# Patient Record
Sex: MALE | Race: WHITE | URBAN - METROPOLITAN AREA
[De-identification: names, ages, dates, MRNs, and addresses within clinical notes are randomized per-mention and may not be internally consistent; named-entity substitution may affect disease eponyms.]

---

## 2017-11-21 ENCOUNTER — HOSPITAL ENCOUNTER (OUTPATIENT)
Dept: WOUND CARE | Age: 77
Discharge: HOME | End: 2017-11-21
Attending: INTERNAL MEDICINE | Admitting: INTERNAL MEDICINE

## 2017-11-21 VITALS
RESPIRATION RATE: 18 BRPM | HEIGHT: 69 IN | WEIGHT: 158.29 LBS | SYSTOLIC BLOOD PRESSURE: 128 MMHG | BODY MASS INDEX: 23.44 KG/M2 | TEMPERATURE: 97.8 F | HEART RATE: 56 BPM | DIASTOLIC BLOOD PRESSURE: 72 MMHG

## 2017-11-21 PROBLEM — L97.929 VENOUS HYPERTENSION, CHRONIC, WITH ULCER AND INFLAMMATION, LEFT (HCC): Chronic | Status: ACTIVE | Noted: 2017-11-21

## 2017-11-21 PROBLEM — D64.9 CHRONIC ANEMIA: Status: ACTIVE | Noted: 2017-06-12

## 2017-11-21 PROBLEM — I87.332 VENOUS HYPERTENSION, CHRONIC, WITH ULCER AND INFLAMMATION, LEFT (HCC): Chronic | Status: ACTIVE | Noted: 2017-11-21

## 2017-11-21 PROBLEM — D61.818 PANCYTOPENIA (HCC): Status: ACTIVE | Noted: 2017-07-19

## 2017-11-21 RX ORDER — VITAMIN B COMPLEX
1 CAPSULE ORAL 2 TIMES DAILY
COMMUNITY

## 2017-11-21 RX ORDER — PANTOPRAZOLE SODIUM 40 MG/1
40 TABLET, DELAYED RELEASE ORAL
COMMUNITY
Start: 2017-08-17

## 2017-11-21 RX ORDER — LIDOCAINE 50 MG/G
OINTMENT TOPICAL PRN
Status: DISCONTINUED | OUTPATIENT
Start: 2017-11-21 | End: 2017-11-22 | Stop reason: HOSPADM

## 2017-11-21 RX ORDER — RIVAROXABAN 20 MG/1
TABLET, FILM COATED ORAL
COMMUNITY
Start: 2017-09-22

## 2017-11-21 RX ORDER — ALBUTEROL SULFATE 90 UG/1
2 AEROSOL, METERED RESPIRATORY (INHALATION) EVERY 6 HOURS PRN
COMMUNITY

## 2017-11-21 RX ORDER — UREA 10 %
800 LOTION (ML) TOPICAL 2 TIMES DAILY
COMMUNITY

## 2017-11-21 RX ORDER — FERROUS SULFATE 325(65) MG
325 TABLET ORAL
COMMUNITY

## 2017-11-21 RX ORDER — VITAMIN B COMPLEX
1 TABLET ORAL DAILY
COMMUNITY

## 2017-11-21 RX ORDER — DOCUSATE SODIUM 100 MG/1
100 CAPSULE, LIQUID FILLED ORAL 2 TIMES DAILY PRN
COMMUNITY

## 2017-11-21 RX ORDER — UBIDECARENONE 100 MG
100 CAPSULE ORAL 2 TIMES DAILY
COMMUNITY

## 2017-11-21 RX ORDER — NITROGLYCERIN 0.4 MG/1
0.4 TABLET SUBLINGUAL EVERY 5 MIN PRN
COMMUNITY

## 2017-11-21 RX ORDER — ATORVASTATIN CALCIUM 10 MG/1
10 TABLET, FILM COATED ORAL
COMMUNITY
Start: 2017-04-17 | End: 2017-11-21 | Stop reason: SDUPTHER

## 2017-11-21 ASSESSMENT — PAIN DESCRIPTION - ORIENTATION: ORIENTATION: RIGHT;LOWER

## 2017-11-21 ASSESSMENT — PAIN DESCRIPTION - DESCRIPTORS: DESCRIPTORS: TENDER;SORE

## 2017-11-21 ASSESSMENT — PAIN SCALES - GENERAL: PAINLEVEL_OUTOF10: 6

## 2017-11-21 ASSESSMENT — PAIN DESCRIPTION - LOCATION: LOCATION: LEG

## 2017-11-21 ASSESSMENT — PAIN DESCRIPTION - FREQUENCY: FREQUENCY: CONTINUOUS

## 2017-11-21 ASSESSMENT — PAIN DESCRIPTION - PAIN TYPE: TYPE: ACUTE PAIN

## 2017-11-21 NOTE — PLAN OF CARE
Problem: Venous:  Goal: Signs of wound healing will improve  Signs of wound healing will improve   Outcome: Ongoing      Problem: Compression therapy:  Goal: Will be free from complications associated with compression therapy  Will be free from complications associated with compression therapy  Outcome: Ongoing      Problem: Falls - Risk of:  Goal: Will remain free from falls  Will remain free from falls  Outcome: Ongoing

## 2017-11-21 NOTE — PROGRESS NOTES
Yasmine Lewis 35   Progress Note and Procedure Note      [address] RECORD NUMBER:  [id number]  AGE: 76 y.o. GENDER: male  : 1940  EPISODE DATE:  2017    Subjective:     Chief Complaint   Patient presents with    Wound Check     Initial visit for roght leg,  First discovered 3 weeks ago. Referred by Dr. Sharon Pro from his office. HISTORY of PRESENT ILLNESS HPI     Candelario Diaz is a 76 y.o. male who presents today for wound/ulcer evaluation. History of Wound Context: After thorough evaluation of Mr. Kiran Remy  in my private practice, I had determined a higher level of care was appropriate. Mr. Kiran Remy has been referred to the outpatient [address] [place] [id number] for advanced wound/ulcer care. Candelario Diaz presents to wound care center for follow up evaluation of wound. Patient has not had any complications since last visit in my office. There are no new complaints of increasing pain, fever, chills, increased drainage, rash, odor, swelling or other constitutional symptoms. Patient has been compliant with wound care instructions. He was given mupirocin ointment to apply to wound 3 times daily. Wound/Ulcer Pain Timing/Severity: mild  Quality of pain: tender  Modifying Factors: Pain worsens with direct pressure on wound with dressing changes  Associated Signs/Symptoms: edema    Ulcer Identification:  Ulcer Type: venous and traumatic  Contributing Factors: edema and venous stasis    Wound: N/A        PAST MEDICAL HISTORY        Diagnosis Date    Arthritis     Atrial fibrillation (HCC)     CAD (coronary artery disease)     Hyperlipidemia     Myocardial infarct     Valvular insufficiency        PAST SURGICAL HISTORY    Past Surgical History:   Procedure Laterality Date    CORONARY ANGIOPLASTY WITH STENT PLACEMENT      HERNIA REPAIR      X 3    KNEE SURGERY Left        FAMILY HISTORY    No family history on file.     SOCIAL HISTORY    Social History Substance Use Topics    Smoking status: Never Smoker    Smokeless tobacco: Not on file    Alcohol use No       ALLERGIES    Allergies   Allergen Reactions    Penicillins        MEDICATIONS    Current Outpatient Prescriptions on File Prior to Encounter   Medication Sig Dispense Refill    atorvastatin (LIPITOR) 10 MG tablet Take 10 mg by mouth daily      finasteride (PROSCAR) 5 MG tablet Take 5 mg by mouth daily      pantoprazole (PROTONIX) 40 MG tablet Take 40 mg by mouth daily       No current facility-administered medications on file prior to encounter. REVIEW OF SYSTEMS    Pertinent items are noted in HPI. Objective:      /72   Pulse 56   Temp 97.8 °F (36.6 °C) (Oral)   Resp 18   Ht 5' 9\" (1.753 m)   Wt 158 lb 4.6 oz (71.8 kg)   BMI 23.38 kg/m²     Wt Readings from Last 3 Encounters:   11/21/17 158 lb 4.6 oz (71.8 kg)   04/28/15 170 lb (77.1 kg)       PHYSICAL EXAM    General Appearance: alert and oriented to person, place and time, well-developed and well-nourished, in no acute distress      Assessment:      There is no problem list on file for this patient. Procedure Note  Indications:  Based on my examination of this patient's wound(s)/ulcer(s) today, debridement is required to promote healing and evaluate the wound base. Performed by: Ry Sharma MD    Consent obtained:  Yes    Time out taken:  Yes    Pain Control: Anesthetic  Anesthetic: 5% Lidocaine Ointment Topical (1 cm)       Debridement:Excisional Debridement    Using curette the wound(s)/ulcer(s) was/were sharply debrided down through and including the removal of subcutaneous tissue.         Devitalized Tissue Debrided:  fibrin, slough and necrotic/eschar    Pre Debridement Measurements:  Are located in the [place]  Documentation Flow Sheet    Wound/Ulcer #: 1    Post Debridement Measurements:  Wound/Ulcer Descriptions are Pre Debridement except measurements:    Wound 11/21/17 Venous ulcer Leg activity restrictions     [] Strict Bedrest: [] Remain off Work:     [] May return to full duty work:                                   [] Return to work with restrictions:     Return Appointment:  [] Wound and dressing supply provider:   [] ECF or Home Healthcare:  [] Nurse visit:  [] Physician or NP scheduled for Nurse Visit:   [x] Return Appointment:  DR. COLLIER IN 1 WEEK  [] Ordered tests:     Nurse Case Manger:  [facility]   Electronically signed by Celio Taylor RN on 11/21/2017 at 8:39 R Derek Al 8 Information: Should you experience any significant changes in your wound(s) or have questions about your wound care, please contact the [facility] at [address] 8:30 am - 4:30 pm and Friday 8:30 am - 1:00 pm.  If you need help with your wound outside these hours and cannot wait until we are again available, contact your PCP or go to the hospital emergency room. Nurse Signature:_______________________    Date: ___________ Time:  ____________      Discharge Nurse Signature      PLEASE NOTE: IF YOU ARE UNABLE TO OBTAIN WOUND SUPPLIES, CONTINUE TO USE THE SUPPLIES YOU HAVE AVAILABLE UNTIL YOU ARE ABLE TO [facility]. IT IS MOST IMPORTANT TO KEEP THE WOUND COVERED AT ALL TIMES. Physician Signature:_______________________    Date: ___________ Time:  ____________               [x] Dr Nile Young           The information contained in the After Visit Summary has been reviewed with me, the patient and/or responsible adult, by my health care provider(s). I had the opportunity to ask questions regarding this information. I have elected to receive;       Patient Signature:_______________________    Date: ___________ Time:  ____________    [] Patient unable to sign Discharge Instructions given to ECF/Transportation/POA      [x]  After Visit Summary  []  Comprehensive Discharge Instruction                Electronically signed by Dilma Hwang MD on 11/21/2017 at 10:01 AM

## 2017-11-28 ENCOUNTER — HOSPITAL ENCOUNTER (OUTPATIENT)
Dept: WOUND CARE | Age: 77
Discharge: HOME | End: 2017-11-28
Attending: INTERNAL MEDICINE | Admitting: INTERNAL MEDICINE

## 2017-11-28 VITALS
RESPIRATION RATE: 20 BRPM | SYSTOLIC BLOOD PRESSURE: 120 MMHG | HEART RATE: 60 BPM | DIASTOLIC BLOOD PRESSURE: 69 MMHG | TEMPERATURE: 97.6 F

## 2017-11-28 RX ORDER — LIDOCAINE HYDROCHLORIDE 40 MG/ML
SOLUTION TOPICAL PRN
Status: DISCONTINUED | OUTPATIENT
Start: 2017-11-28 | End: 2017-11-29 | Stop reason: HOSPADM

## 2017-11-28 NOTE — PROGRESS NOTES
Yasmine Lewis 35   Progress Note and Procedure Note      [address] RECORD NUMBER:  [id number]  AGE: 76 y.o. GENDER: male  : 1940  EPISODE DATE:  2017    Subjective:     Chief Complaint   Patient presents with    Wound Check     follow up right lower leg wound         HISTORY of PRESENT ILLNESS HPI     Dario Keane is a 76 y.o. male who presents today for wound/ulcer evaluation. History of Wound Context: Dario Keane presents to wound care center for follow up evaluation of wound. Patient has not had any complications since last visit. There are no new complaints of increasing pain, fever, chills, increased drainage, rash, odor, swelling or other constitutional symptoms. Patient has been compliant with wound care instructions. Wound/Ulcer Pain Timing/Severity: mild  Quality of pain: tender        PAST MEDICAL HISTORY        Diagnosis Date    Arthritis     Atrial fibrillation ([address]     CAD (coronary artery disease)     Hyperlipidemia     Myocardial infarct     Valvular insufficiency        PAST SURGICAL HISTORY    Past Surgical History:   Procedure Laterality Date    CORONARY ANGIOPLASTY WITH STENT PLACEMENT      HERNIA REPAIR      X 3    KNEE SURGERY Left        FAMILY HISTORY    History reviewed. No pertinent family history.     SOCIAL HISTORY    Social History   Substance Use Topics    Smoking status: Never Smoker    Smokeless tobacco: Not on file    Alcohol use No       ALLERGIES    Allergies   Allergen Reactions    Penicillins        MEDICATIONS    Current Outpatient Prescriptions on File Prior to Encounter   Medication Sig Dispense Refill    Ascorbic Acid ER 1000 MG TBCR Take 2 tablets by mouth      b complex vitamins capsule Take 1 capsule by mouth 2 times daily      Cholecalciferol 2000 units CAPS Take 1 capsule by mouth daily      ferrous sulfate 325 (65 Fe) MG tablet Take 325 mg by mouth daily (with breakfast)      folic acid 11/21/2017  8:20 AM   Odor None 11/28/2017  8:17 AM   Margins Undefined edges 11/28/2017  8:17 AM   Keerthi-wound Assessment Pink;Dry 11/28/2017  8:17 AM   Non-staged Wound Description Full thickness 11/28/2017  8:17 AM   Red%Wound Bed 25 11/21/2017  8:20 AM   Yellow%Wound Bed 75 11/21/2017  8:20 AM   Black%Wound Bed 100 11/28/2017  8:17 AM   Number of days: 7       Percent of Wound(s)/Ulcer(s) Debrided: 100%    Total Surface Area Debrided:  0.32 sq cm       Diabetic/Pressure/Non Pressure Ulcers only:  Ulcer: Non-Pressure ulcer, limited to breakdown of skin      Estimated Blood Loss:  Minimal    Hemostasis Achieved:  by pressure    Procedural Pain:  2  / 10     Post Procedural Pain:  0 / 10     Response to treatment:  Well tolerated by patient. Plan:     Treatment Note please see attached Discharge Instructions    Written patient dismissal instructions given to patient and signed by patient or POA. Discharge Instructions       Wound Clinic Physician Orders and Discharge Instructions  [place] [address]  Telephone: [phone]     NAME:  Sherine Gunn OF BIRTH:  1940  MEDICAL RECORD NUMBER:  [id number]  DATE:  11/28/2017     Wound Cleansing:   Do not scrub or use excessive force. Cleanse wound prior to applying a clean dressing with:  [x] Normal Saline            [x] Keep Wound Dry in Shower            Topical Treatments:  Do not apply lotions, creams, or ointments to wound bed unless directed. [] Apply moisturizing lotion to skin surrounding the wound prior to dressing change.  [] Apply antifungal ointment to skin surrounding the wound prior to dressing change.  [] Apply thin film of moisture barrier ointment to skin immediately around wound.   [] Other:                 Dressings:                  Wound Location  RIGHT LOWER LEG                 [x] Apply Primary Dressing: Return Appointment:  DR. Suzan Damon 1 WEEK  [] Ordered tests:      Nurse Case Manger:  [address]     Electronically signed by Giuliana Barrett RN on 11/28/2017 at 8:32 [facility] Information: Should you experience any significant changes in your wound(s) or have questions about your wound care, please contact the [address] [place] [address] at [address] 8:30 am - 4:30 pm and Friday 8:30 am - 1:00 pm.  If you need help with your wound outside these hours and cannot wait until we are again available, contact your PCP or go to the hospital emergency room.      Nurse Signature:_______________________     Date: ___________ Time:  ____________        Discharge Nurse Signature        PLEASE NOTE: IF YOU ARE UNABLE TO OBTAIN WOUND SUPPLIES, CONTINUE TO USE THE SUPPLIES YOU HAVE AVAILABLE UNTIL YOU ARE ABLE TO [facility]. IT IS MOST IMPORTANT TO KEEP THE WOUND COVERED AT ALL TIMES. Physician Signature:_______________________     Date: ___________ Time:  ____________                                        [x] Dr Chaparro Simmons                 The information contained in the After Visit Summary has been reviewed with me, the patient and/or responsible adult, by my health care provider(s). I had the opportunity to ask questions regarding this information.   I have elected to receive;        Patient Signature:_______________________     Date: ___________ Time:  ____________     [] Patient unable to sign Discharge Instructions given to ECF/Transportation/POA        [x]  After Visit Summary  []  Comprehensive Discharge Instruction        Electronically signed by Julia Carcamo MD on 11/28/2017 at 8:36 AM

## 2017-12-12 ENCOUNTER — HOSPITAL ENCOUNTER (OUTPATIENT)
Dept: WOUND CARE | Age: 77
Discharge: HOME | End: 2017-12-12
Admitting: INTERNAL MEDICINE

## 2017-12-12 VITALS
HEART RATE: 67 BPM | TEMPERATURE: 98.5 F | DIASTOLIC BLOOD PRESSURE: 74 MMHG | RESPIRATION RATE: 16 BRPM | SYSTOLIC BLOOD PRESSURE: 126 MMHG

## 2017-12-12 RX ORDER — LIDOCAINE HYDROCHLORIDE 40 MG/ML
SOLUTION TOPICAL PRN
Status: DISCONTINUED | OUTPATIENT
Start: 2017-12-12 | End: 2017-12-13 | Stop reason: HOSPADM

## 2017-12-12 NOTE — PROGRESS NOTES
Yasmine Lewis 35   Progress Note and Procedure Note      [address] RECORD NUMBER:  [id number]  AGE: 76 y.o. GENDER: male  : 1940  EPISODE DATE:  2017    Subjective:     Chief Complaint   Patient presents with    Wound Check     wound right lower leg         HISTORY of PRESENT ILLNESS HPI     Dacia Garg is a 76 y.o. male who presents today for wound/ulcer evaluation. History of Wound Context: Dacia Garg presents to wound care center for follow up evaluation of wound. Patient has not had any complications since last visit. There are no new complaints of increasing pain, fever, chills, increased drainage, rash, odor, swelling or other constitutional symptoms. Patient has been compliant with wound care instructions. PAST MEDICAL HISTORY        Diagnosis Date    Arthritis     Atrial fibrillation ([address]     CAD (coronary artery disease)     Hyperlipidemia     Myocardial infarct     Valvular insufficiency        PAST SURGICAL HISTORY    Past Surgical History:   Procedure Laterality Date    CORONARY ANGIOPLASTY WITH STENT PLACEMENT      HERNIA REPAIR      X 3    KNEE SURGERY Left        FAMILY HISTORY    No family history on file.     SOCIAL HISTORY    Social History   Substance Use Topics    Smoking status: Never Smoker    Smokeless tobacco: Never Used    Alcohol use No       ALLERGIES    Allergies   Allergen Reactions    Penicillins        MEDICATIONS    Current Outpatient Prescriptions on File Prior to Encounter   Medication Sig Dispense Refill    Ascorbic Acid ER 1000 MG TBCR Take 2 tablets by mouth      b complex vitamins capsule Take 1 capsule by mouth 2 times daily      Cholecalciferol 2000 units CAPS Take 1 capsule by mouth daily      coenzyme Q10 100 MG CAPS capsule Take 100 mg by mouth 2 times daily      Cyanocobalamin 2500 MCG SUBL Place 1 tablet under the tongue daily      docusate sodium (COLACE) 100 MG capsule Take 100 mg  Mixed hyperlipidemia E78.2    Moderate mitral regurgitation I34.0    MVP (mitral valve prolapse) I34.1    Pancytopenia (HCC) D61.818    Permanent atrial fibrillation (HCC) I48.2    SSS (sick sinus syndrome) (HCC) I49.5    Thrombocytopenia (HCC) D69.6    Venous hypertension, chronic, with ulcer, right ([address] I87.311        Procedure Note  Indications:  Based on my examination of this patient's wound(s)/ulcer(s) today, debridement is required to promote healing and evaluate the wound base. Performed by: Koko Murray MD    Consent obtained:  Yes    Time out taken:  Yes    Pain Control: Anesthetic  Anesthetic: 4% Lidocaine Liquid Topical (2.5 ml)       Debridement:Excisional Debridement    Using curette the wound(s)/ulcer(s) was/were sharply debrided down through and including the removal of subcutaneous tissue. Devitalized Tissue Debrided:  fibrin, slough and necrotic/eschar    Pre Debridement Measurements:  Are located in the [place]  Documentation Flow Sheet    Wound/Ulcer #: 1    Post Debridement Measurements:  Wound/Ulcer Descriptions are Pre Debridement except measurements:    Wound 11/21/17 Venous ulcer Leg Right; Lower; Anterior #1 (Active)   Wound Image   12/12/2017  8:15 AM   Wound Type Wound 12/12/2017  8:15 AM   Wound Venous 12/12/2017  8:15 AM   Dressing Status Clean;Dry; Intact 12/12/2017  8:15 AM   Dressing Changed Changed/New 11/28/2017  8:41 AM   Dressing/Treatment Other (Comment) 12/12/2017  9:02 AM   Wound Length (cm) 0.5 cm 12/12/2017  9:02 AM   Wound Width (cm) 0.4 cm 12/12/2017  9:02 AM   Wound Depth (cm)  0.2 12/12/2017  9:02 AM   Calculated Wound Size (cm^2) (l*w) 0.2 cm^2 12/12/2017  9:02 AM   Change in Wound Size % (l*w) 33.33 12/12/2017  9:02 AM   Wound Assessment Slough 12/12/2017  8:15 AM   Drainage Amount None 12/12/2017  8:15 AM   Drainage Description Yellow 11/21/2017  8:20 AM   Odor None 12/12/2017  8:15 AM   Margins Attached edges 12/12/2017  8:15 AM                       [x] Gauze [x] Devika                       Avoid contact of tape with skin.     [x] Change dressing:                   [x] 3 TIMES PER WEEK                 Edema Control:  Apply:  [] Compression Stocking           []Right Leg         []Left Leg                 [x] SpandaGrip    [x]Right Leg         [x]Left Leg                                      []Low compression 5-10 mm/Hg                                                 [x]Medium compression 10-20 mm/Hg                                      []High compression  20-30 mm/Hg              every morning immediately when getting up should be applied to affected leg(s) from mid foot to knee making sure to cover the heel.  Remove every night before going to bed.              [x] Elevate leg(s) above the level of the heart when sitting.                    [x] Avoid prolonged standing in one place.        Compression:  Apply:  [] Multilayer Compression Wrap Applied in Clinic        []RightLeg          []Left Leg              [] Multi-layer compression.  Do not get leg(s) with wrap wet.  If wraps become too tight call the center or completely remove the wrap.                 [x] Elevate leg(s) above the level of the heart when sitting.                    [x] Avoid prolonged standing in one place.        Dietary:  [x] Diet as tolerated:        [] Calorie Diabetic Diet: [] No Added Salt:  [] Increase Protein:        [] Other:   Activity:  [x] Activity as tolerated:  [] Patient has no activity restrictions     [] Strict Bedrest:            [] Remain off Work:     [] May return to full duty work:                                         [] YOEDFR to work with restrictions:          Return Appointment:  [] Wound and dressing supply provider:   [] ECF or Home Healthcare:  [] Nurse visit:     [] Physician or NP scheduled for Nurse Visit:   [x] Return Appointment:  DR. GUERRERO IN 1 WEEK  [] Ordered tests:      Nurse Case Manger: [facility]     Electronically signed by

## 2017-12-19 ENCOUNTER — HOSPITAL ENCOUNTER (OUTPATIENT)
Dept: WOUND CARE | Age: 77
Discharge: HOME | End: 2017-12-19
Attending: SURGERY | Admitting: SURGERY

## 2017-12-19 VITALS
RESPIRATION RATE: 18 BRPM | TEMPERATURE: 98 F | HEART RATE: 62 BPM | DIASTOLIC BLOOD PRESSURE: 69 MMHG | SYSTOLIC BLOOD PRESSURE: 119 MMHG

## 2017-12-19 DIAGNOSIS — Z79.01 LONG TERM CURRENT USE OF ANTICOAGULANTS WITH INR GOAL OF 2.0-3.0: ICD-10-CM

## 2017-12-19 PROCEDURE — 11042 DBRDMT SUBQ TIS 1ST 20SQCM/<: CPT | Performed by: SURGERY

## 2017-12-19 RX ORDER — LIDOCAINE HYDROCHLORIDE 40 MG/ML
SOLUTION TOPICAL ONCE
Status: DISCONTINUED | OUTPATIENT
Start: 2017-12-19 | End: 2017-12-20 | Stop reason: HOSPADM

## 2017-12-19 NOTE — PROGRESS NOTES
 b complex vitamins capsule Take 1 capsule by mouth 2 times daily        Cholecalciferol 2000 units CAPS Take 1 capsule by mouth daily        coenzyme Q10 100 MG CAPS capsule Take 100 mg by mouth 2 times daily        Cyanocobalamin 2500 MCG SUBL Place 1 tablet under the tongue daily        docusate sodium (COLACE) 100 MG capsule Take 100 mg by mouth 2 times daily as needed for Constipation        ferrous sulfate 325 (65 Fe) MG tablet Take 325 mg by mouth daily (with breakfast)        folic acid (FOLVITE) 785 MCG tablet Take 800 mcg by mouth 2 times daily        Garlic 5658 MG CAPS Take 1 tablet by mouth daily        Multiple Vitamin (THERAGRAN PO) Take 2 tablets by mouth daily        XARELTO 20 MG TABS tablet          pantoprazole (PROTONIX) 40 MG tablet Take 40 mg by mouth        atorvastatin (LIPITOR) 10 MG tablet Take 10 mg by mouth daily        finasteride (PROSCAR) 5 MG tablet Take 5 mg by mouth daily        pantoprazole (PROTONIX) 40 MG tablet Take 40 mg by mouth daily        albuterol sulfate  (90 Base) MCG/ACT inhaler Inhale 2 puffs into the lungs every 6 hours as needed for Wheezing        mupirocin (BACTROBAN) 2 % ointment Apply topically 3 times daily Apply topically 3 times daily.        nitroGLYCERIN (NITROSTAT) 0.4 MG SL tablet Place 0.4 mg under the tongue every 5 minutes as needed for Chest pain up to max of 3 total doses.  If no relief after 1 dose, call 911.          No current facility-administered medications on file prior to encounter.          REVIEW OF SYSTEMS     Pertinent items are noted in HPI.     Objective:       /74   Pulse 67   Temp 98.5 °F (36.9 °C) (Oral)   Resp 16          Wt Readings from Last 3 Encounters:   11/21/17 158 lb 4.6 oz (71.8 kg)   04/28/15 170 lb (77.1 kg)         PHYSICAL EXAM     General Appearance: alert and oriented to person, place and time, well-developed and well-nourished, in no acute distress HAS PALP +2 DP BILAT, has marked VV        Assessment:           Patient Active Problem List   Diagnosis Code    CAD S/P percutaneous coronary angioplasty I25.10, Z98.61    CAD, multiple vessel I25.10    Chronic anemia D64.9    Gastroesophageal reflux disease without esophagitis K21.9    TONY (iron deficiency anemia) D50.9    Long term current use of anticoagulants with INR goal of 2.0-3.0 Z79.01    MGUS (monoclonal gammopathy of unknown significance) D47.2    Mixed hyperlipidemia E78.2    Moderate mitral regurgitation I34.0    MVP (mitral valve prolapse) I34.1    Pancytopenia (HCC) D61.818    Permanent atrial fibrillation (HCC) I48.2    SSS (sick sinus syndrome) (HCC) I49.5    Thrombocytopenia (HCC) D69.6    Venous hypertension, chronic, with ulcer, right ([facility]) I87.311                Excisional Debridement Procedure Note  Indications:  Based on my examination of this patient's wound(s)/ulcer(s) today, debridement is required to promote healing and evaluate the wound base. Performed by: Mona Calhoun MD    Consent obtained? Yes    Time out taken: Yes    Pain Control: Anesthetic: 4% Lidocaine Liquid Topical (2.5ml)     Debridement:Excisional Debridement    Using curette the wound/ulcer was sharply debrided    down through and including the removal of  dermis and subcutaneous tissue. Devitalized Tissue Debrided:  fibrin, biofilm and slough      Pre Debridement Measurements:  Are located in the [place]  Documentation Flow Sheet   Wound/Ulcer #: 1     Post  Debridement Measurements:  Wound 11/21/17 Venous ulcer Leg Right; Lower; Anterior #1 (Active)   Wound Image   12/12/2017  8:15 AM   Wound Type Wound 12/12/2017  8:15 AM   Wound Venous 12/12/2017  8:15 AM   Dressing Status Intact;Dry; Old drainage 12/19/2017  8:16 AM   Dressing Changed Changed/New 11/28/2017  8:41 AM   Dressing/Treatment Other (Comment) 12/19/2017  8:58 AM   Wound Length (cm) 0.5 cm 12/19/2017  8:42 AM   Wound Width (cm) 0.3 cm 12/19/2017  8:42 AM   Wound Depth (cm)  0.2 12/19/2017  8:42 AM   Calculated Wound Size (cm^2) (l*w) 0.15 cm^2 12/19/2017  8:42 AM   Change in Wound Size % (l*w) 50 12/19/2017  8:42 AM   Wound Assessment Red;Yellow 12/19/2017  8:16 AM   Drainage Amount Scant 12/19/2017  8:16 AM   Drainage Description Serosanguinous 12/19/2017  8:16 AM   Odor None 12/12/2017  8:15 AM   Margins Attached edges 12/12/2017  8:15 AM   Keerthi-wound Assessment Dry;Pink 12/19/2017  8:16 AM   Non-staged Wound Description Full thickness 12/19/2017  8:16 AM   Red%Wound Bed 95 12/19/2017  8:16 AM   Yellow%Wound Bed 5 12/19/2017  8:16 AM   Black%Wound Bed 100 11/28/2017  8:17 AM   Number of days: 28       Percent of Wound/Ulcer Debrided: 100%    Total Surface Area Debrided:  0.15 sq cm    Diabetic/Pressure/Non Pressure Ulcers only:  Ulcer: N/A    Bleeding: Minimal    Hemostasis Achieved: by pressure    Procedural Pain: 1  / 10     Post Procedural Pain: 0 / 10     Response to treatment:  Well tolerated by patient. Pt may want to look into VV operation. Needs reflux duplex scan, if most deep vein valves are good then VV operation may really help.  Pt says his mother had VV operation and had a bad result, I explained that  Operatives techniques have markedly improved since then

## 2018-01-02 ENCOUNTER — HOSPITAL ENCOUNTER (OUTPATIENT)
Dept: WOUND CARE | Age: 78
Discharge: HOME | End: 2018-01-02
Attending: INTERNAL MEDICINE | Admitting: INTERNAL MEDICINE

## 2018-01-02 VITALS
HEART RATE: 61 BPM | BODY MASS INDEX: 23.83 KG/M2 | WEIGHT: 161.38 LBS | RESPIRATION RATE: 20 BRPM | SYSTOLIC BLOOD PRESSURE: 129 MMHG | TEMPERATURE: 97.8 F | DIASTOLIC BLOOD PRESSURE: 75 MMHG

## 2018-01-02 PROCEDURE — 99212 OFFICE O/P EST SF 10 MIN: CPT | Performed by: NURSE PRACTITIONER

## 2018-01-02 RX ORDER — LIDOCAINE HYDROCHLORIDE 40 MG/ML
SOLUTION TOPICAL ONCE
Status: DISCONTINUED | OUTPATIENT
Start: 2018-01-02 | End: 2018-01-03 | Stop reason: HOSPADM

## 2018-01-02 RX ORDER — ESCITALOPRAM OXALATE 10 MG/1
10 TABLET ORAL DAILY
COMMUNITY

## 2018-01-02 NOTE — PROGRESS NOTES
Yasmine Lewis 35   Progress Note and Procedure Note      [address] [place],[address] RECORD NUMBER:  [id number]  AGE: 76 y.o. GENDER: male  : 1940  EPISODE DATE:  2018    Subjective:     Chief Complaint   Patient presents with    Wound Check     follow up right lower leg wound         HISTORY of PRESENT ILLNESS HPI     Charmayne Pauling is a 76 y.o. male who presents today for wound/ulcer evaluation. History of Wound Context:   Patient has not had any complications since last visit. Verlean Megan are no new complaints of increasing pain, fever, chills, increased drainage, rash, odor, swelling or other constitutional symptoms.  Patient has been compliant with wound care instructions. Pt states wants to see Dr. Indiana Benson next week for his final visit. History of Wound Context: venous  Wound/Ulcer Pain Timing/Severity: intermittent, mild  Quality of pain: tender  Severity:  2 / 10   Modifying Factors: Pain with topical care  Associated Signs/Symptoms: none and edema    Ulcer Identification:  Ulcer Type: venous  Contributing Factors: venous stasis    Wound: N/A        PAST MEDICAL HISTORY        Diagnosis Date    Arthritis     Atrial fibrillation (HCC)     CAD (coronary artery disease)     Hyperlipidemia     Myocardial infarct     Valvular insufficiency        PAST SURGICAL HISTORY    Past Surgical History:   Procedure Laterality Date    CORONARY ANGIOPLASTY WITH STENT PLACEMENT      HERNIA REPAIR      X 3    KNEE SURGERY Left        FAMILY HISTORY    History reviewed. No pertinent family history.     SOCIAL HISTORY    Social History   Substance Use Topics    Smoking status: Never Smoker    Smokeless tobacco: Never Used    Alcohol use No       ALLERGIES    Allergies   Allergen Reactions    Penicillins        MEDICATIONS    Current Outpatient Prescriptions on File Prior to Encounter   Medication Sig Dispense Refill    Ascorbic Acid ER 1000 MG TBCR Take 2 tablets by mouth     

## 2018-01-09 ENCOUNTER — HOSPITAL ENCOUNTER (OUTPATIENT)
Dept: WOUND CARE | Age: 78
Discharge: HOME | End: 2018-01-09
Attending: INTERNAL MEDICINE | Admitting: INTERNAL MEDICINE

## 2018-01-09 VITALS — TEMPERATURE: 97.8 F | SYSTOLIC BLOOD PRESSURE: 130 MMHG | DIASTOLIC BLOOD PRESSURE: 69 MMHG | HEART RATE: 52 BPM

## 2021-12-19 ENCOUNTER — HOSPITAL ENCOUNTER (EMERGENCY)
Age: 81
Discharge: PSYCHIATRIC OR SUBSTANCE USE FACILITY | End: 2021-12-20
Attending: EMERGENCY MEDICINE

## 2021-12-19 DIAGNOSIS — G30.9 ALZHEIMER'S DEMENTIA WITH BEHAVIORAL DISTURBANCE, UNSPECIFIED TIMING OF DEMENTIA ONSET: Primary | ICD-10-CM

## 2021-12-19 DIAGNOSIS — F32.2 CURRENT SEVERE EPISODE OF MAJOR DEPRESSIVE DISORDER WITHOUT PSYCHOTIC FEATURES, UNSPECIFIED WHETHER RECURRENT (CMD): ICD-10-CM

## 2021-12-19 DIAGNOSIS — F02.81 ALZHEIMER'S DEMENTIA WITH BEHAVIORAL DISTURBANCE, UNSPECIFIED TIMING OF DEMENTIA ONSET: Primary | ICD-10-CM

## 2021-12-19 LAB
ALBUMIN SERPL-MCNC: 3.5 G/DL (ref 3.6–5.1)
ALP SERPL-CCNC: 62 UNITS/L (ref 45–117)
ALT SERPL-CCNC: 34 UNITS/L
ANION GAP SERPL CALC-SCNC: 7 MMOL/L (ref 10–20)
APAP SERPL-MCNC: <2 MCG/ML (ref 10–30)
APPEARANCE UR: CLEAR
APTT PPP: 36 SEC (ref 22–30)
AST SERPL-CCNC: 41 UNITS/L
BACTERIA #/AREA URNS HPF: ABNORMAL /HPF
BASOPHILS # BLD: 0 K/MCL (ref 0–0.3)
BASOPHILS NFR BLD: 1 %
BILIRUB CONJ SERPL-MCNC: 0.2 MG/DL (ref 0–0.2)
BILIRUB SERPL-MCNC: 0.6 MG/DL (ref 0.2–1)
BILIRUB UR QL STRIP: NEGATIVE
BUN SERPL-MCNC: 32 MG/DL (ref 6–20)
BUN/CREAT SERPL: 33 (ref 7–25)
CALCIUM SERPL-MCNC: 9 MG/DL (ref 8.4–10.2)
CHLORIDE SERPL-SCNC: 111 MMOL/L (ref 98–107)
CO2 SERPL-SCNC: 28 MMOL/L (ref 21–32)
COLOR UR: YELLOW
CREAT SERPL-MCNC: 0.96 MG/DL (ref 0.67–1.17)
DEPRECATED RDW RBC: 54.1 FL (ref 39–50)
EOSINOPHIL # BLD: 0.1 K/MCL (ref 0–0.5)
EOSINOPHIL NFR BLD: 2 %
ERYTHROCYTE [DISTWIDTH] IN BLOOD: 14.6 % (ref 11–15)
ETHANOL SERPL-MCNC: NORMAL MG/DL
FASTING DURATION TIME PATIENT: ABNORMAL H
GFR SERPLBLD BASED ON 1.73 SQ M-ARVRAT: 73 ML/MIN
GLUCOSE SERPL-MCNC: 88 MG/DL (ref 70–99)
GLUCOSE UR STRIP-MCNC: NEGATIVE MG/DL
HCT VFR BLD CALC: 36.8 % (ref 39–51)
HGB BLD-MCNC: 11.4 G/DL (ref 13–17)
HGB UR QL STRIP: NEGATIVE
HYALINE CASTS #/AREA URNS LPF: ABNORMAL /LPF
IMM GRANULOCYTES # BLD AUTO: 0 K/MCL (ref 0–0.2)
IMM GRANULOCYTES # BLD: 0 %
INR PPP: 2.4
KETONES UR STRIP-MCNC: NEGATIVE MG/DL
LEUKOCYTE ESTERASE UR QL STRIP: NEGATIVE
LYMPHOCYTES # BLD: 1.1 K/MCL (ref 1–4)
LYMPHOCYTES NFR BLD: 23 %
MCH RBC QN AUTO: 31.3 PG (ref 26–34)
MCHC RBC AUTO-ENTMCNC: 31 G/DL (ref 32–36.5)
MCV RBC AUTO: 101.1 FL (ref 78–100)
MONOCYTES # BLD: 0.3 K/MCL (ref 0.3–0.9)
MONOCYTES NFR BLD: 7 %
NEUTROPHILS # BLD: 3.1 K/MCL (ref 1.8–7.7)
NEUTROPHILS NFR BLD: 67 %
NITRITE UR QL STRIP: NEGATIVE
NRBC BLD MANUAL-RTO: 0 /100 WBC
PH UR STRIP: 5 [PH] (ref 5–7)
PLATELET # BLD AUTO: 127 K/MCL (ref 140–450)
POTASSIUM SERPL-SCNC: 4.3 MMOL/L (ref 3.4–5.1)
PROT SERPL-MCNC: 7.3 G/DL (ref 6.4–8.2)
PROT UR STRIP-MCNC: 100 MG/DL
PROTHROMBIN TIME: 24.8 SEC (ref 9.7–11.8)
RBC # BLD: 3.64 MIL/MCL (ref 4.5–5.9)
RBC #/AREA URNS HPF: ABNORMAL /HPF
SALICYLATES SERPL-MCNC: <2.8 MG/DL
SARS-COV-2 RNA RESP QL NAA+PROBE: NOT DETECTED
SERVICE CMNT-IMP: NORMAL
SERVICE CMNT-IMP: NORMAL
SODIUM SERPL-SCNC: 142 MMOL/L (ref 135–145)
SP GR UR STRIP: 1.03 (ref 1–1.03)
SQUAMOUS #/AREA URNS HPF: ABNORMAL /HPF
UROBILINOGEN UR STRIP-MCNC: 0.2 MG/DL
VALPROATE SERPL-MCNC: <3 MCG/ML (ref 50–125)
WBC # BLD: 4.6 K/MCL (ref 4.2–11)
WBC #/AREA URNS HPF: ABNORMAL /HPF

## 2021-12-19 PROCEDURE — 93010 ELECTROCARDIOGRAM REPORT: CPT | Performed by: INTERNAL MEDICINE

## 2021-12-19 PROCEDURE — 90839 PSYTX CRISIS INITIAL 60 MIN: CPT

## 2021-12-19 PROCEDURE — 85610 PROTHROMBIN TIME: CPT | Performed by: EMERGENCY MEDICINE

## 2021-12-19 PROCEDURE — 96376 TX/PRO/DX INJ SAME DRUG ADON: CPT

## 2021-12-19 PROCEDURE — 80164 ASSAY DIPROPYLACETIC ACD TOT: CPT | Performed by: EMERGENCY MEDICINE

## 2021-12-19 PROCEDURE — C9803 HOPD COVID-19 SPEC COLLECT: HCPCS

## 2021-12-19 PROCEDURE — 96374 THER/PROPH/DIAG INJ IV PUSH: CPT

## 2021-12-19 PROCEDURE — 80048 BASIC METABOLIC PNL TOTAL CA: CPT | Performed by: EMERGENCY MEDICINE

## 2021-12-19 PROCEDURE — 99285 EMERGENCY DEPT VISIT HI MDM: CPT

## 2021-12-19 PROCEDURE — 81001 URINALYSIS AUTO W/SCOPE: CPT | Performed by: EMERGENCY MEDICINE

## 2021-12-19 PROCEDURE — 96375 TX/PRO/DX INJ NEW DRUG ADDON: CPT

## 2021-12-19 PROCEDURE — 80076 HEPATIC FUNCTION PANEL: CPT | Performed by: EMERGENCY MEDICINE

## 2021-12-19 PROCEDURE — 80179 DRUG ASSAY SALICYLATE: CPT | Performed by: EMERGENCY MEDICINE

## 2021-12-19 PROCEDURE — 10002800 HB RX 250 W HCPCS: Performed by: EMERGENCY MEDICINE

## 2021-12-19 PROCEDURE — 87635 SARS-COV-2 COVID-19 AMP PRB: CPT | Performed by: EMERGENCY MEDICINE

## 2021-12-19 PROCEDURE — 80143 DRUG ASSAY ACETAMINOPHEN: CPT | Performed by: EMERGENCY MEDICINE

## 2021-12-19 PROCEDURE — 85730 THROMBOPLASTIN TIME PARTIAL: CPT | Performed by: EMERGENCY MEDICINE

## 2021-12-19 PROCEDURE — 82077 ASSAY SPEC XCP UR&BREATH IA: CPT | Performed by: EMERGENCY MEDICINE

## 2021-12-19 PROCEDURE — 85025 COMPLETE CBC W/AUTO DIFF WBC: CPT | Performed by: EMERGENCY MEDICINE

## 2021-12-19 PROCEDURE — 93005 ELECTROCARDIOGRAM TRACING: CPT | Performed by: EMERGENCY MEDICINE

## 2021-12-19 RX ORDER — ATORVASTATIN CALCIUM 10 MG/1
TABLET, FILM COATED ORAL
COMMUNITY
Start: 2021-09-20

## 2021-12-19 RX ORDER — HALOPERIDOL 5 MG/ML
2 INJECTION INTRAMUSCULAR ONCE
Status: COMPLETED | OUTPATIENT
Start: 2021-12-19 | End: 2021-12-19

## 2021-12-19 RX ORDER — HALOPERIDOL 5 MG/ML
2 INJECTION INTRAMUSCULAR EVERY 4 HOURS PRN
Status: DISCONTINUED | OUTPATIENT
Start: 2021-12-19 | End: 2021-12-20 | Stop reason: HOSPADM

## 2021-12-19 RX ORDER — GABAPENTIN 100 MG/1
CAPSULE ORAL
COMMUNITY
Start: 2021-12-04

## 2021-12-19 RX ORDER — WARFARIN SODIUM 5 MG/1
TABLET ORAL
COMMUNITY
Start: 2021-09-19

## 2021-12-19 RX ORDER — HALOPERIDOL 5 MG/ML
2 INJECTION INTRAMUSCULAR EVERY 6 HOURS PRN
Status: DISCONTINUED | OUTPATIENT
Start: 2021-12-19 | End: 2021-12-19

## 2021-12-19 RX ORDER — LORAZEPAM 2 MG/ML
1 INJECTION INTRAMUSCULAR ONCE
Status: COMPLETED | OUTPATIENT
Start: 2021-12-19 | End: 2021-12-19

## 2021-12-19 RX ORDER — LORAZEPAM 2 MG/ML
1 INJECTION INTRAMUSCULAR EVERY 4 HOURS PRN
Status: DISCONTINUED | OUTPATIENT
Start: 2021-12-19 | End: 2021-12-20 | Stop reason: HOSPADM

## 2021-12-19 RX ORDER — DIVALPROEX SODIUM 250 MG/1
TABLET, EXTENDED RELEASE ORAL
COMMUNITY
Start: 2021-12-17

## 2021-12-19 RX ADMIN — HALOPERIDOL LACTATE 2 MG: 5 INJECTION INTRAMUSCULAR at 17:59

## 2021-12-19 RX ADMIN — LORAZEPAM 1 MG: 2 INJECTION INTRAMUSCULAR; INTRAVENOUS at 17:59

## 2021-12-19 ASSESSMENT — COGNITIVE AND FUNCTIONAL STATUS - GENERAL
MEMORY: UNABLE TO ASSESS
ORIENTATION: UNABLE TO ASSESS
BEHAVIOR: POOR EYE CONTACT
SPEECH: WEAK VOICE;BREATHY VOICE
AFFECT: LABILE
LEVEL_OF_CONSCIOUSNESS_CALCULATED: LETHARGIC
MOTOR_BEHAVIOR-AGITATION_CALCULATED: RESTLESS
MOOD: LABILE
ATTENTION_CALCULATED: UNABLE TO ASSESS
PERCEPTUAL_MISINTERPRETATIONS_HALLUCINATIONS: AUDITORY
MOTOR_BEHAVIOR-RETARDATION_CALCULATED: SLOWNESS OF MOTOR RESPONSES

## 2021-12-20 VITALS
TEMPERATURE: 98.8 F | WEIGHT: 172.84 LBS | SYSTOLIC BLOOD PRESSURE: 115 MMHG | DIASTOLIC BLOOD PRESSURE: 65 MMHG | RESPIRATION RATE: 17 BRPM | HEART RATE: 58 BPM | OXYGEN SATURATION: 99 %

## 2021-12-20 LAB
ATRIAL RATE (BPM): 381
QRS-INTERVAL (MSEC): 88
QT-INTERVAL (MSEC): 432
QTC: 413
R AXIS (DEGREES): 42
RAINBOW EXTRA TUBES HOLD SPECIMEN: NORMAL
REPORT TEXT: NORMAL
T AXIS (DEGREES): 65
VENTRICULAR RATE EKG/MIN (BPM): 55

## 2021-12-20 PROCEDURE — 10002800 HB RX 250 W HCPCS: Performed by: EMERGENCY MEDICINE

## 2021-12-20 PROCEDURE — 10002803 HB RX 637: Performed by: EMERGENCY MEDICINE

## 2021-12-20 PROCEDURE — 90792 PSYCH DIAG EVAL W/MED SRVCS: CPT | Performed by: PSYCHIATRY & NEUROLOGY

## 2021-12-20 RX ORDER — DIVALPROEX SODIUM 250 MG/1
250 TABLET, EXTENDED RELEASE ORAL NIGHTLY
Status: DISCONTINUED | OUTPATIENT
Start: 2021-12-20 | End: 2021-12-20 | Stop reason: HOSPADM

## 2021-12-20 RX ORDER — GABAPENTIN 100 MG/1
100 CAPSULE ORAL DAILY
Status: DISCONTINUED | OUTPATIENT
Start: 2021-12-20 | End: 2021-12-20 | Stop reason: HOSPADM

## 2021-12-20 RX ORDER — WARFARIN SODIUM 5 MG/1
5 TABLET ORAL
Status: DISCONTINUED | OUTPATIENT
Start: 2021-12-21 | End: 2021-12-20 | Stop reason: HOSPADM

## 2021-12-20 RX ORDER — FOLIC ACID/VIT B COMPLEX AND C 0.8 MG
1 TABLET ORAL DAILY
Status: DISCONTINUED | OUTPATIENT
Start: 2021-12-20 | End: 2021-12-20 | Stop reason: HOSPADM

## 2021-12-20 RX ORDER — WARFARIN SODIUM 7.5 MG/1
7.5 TABLET ORAL
Status: DISCONTINUED | OUTPATIENT
Start: 2021-12-20 | End: 2021-12-20 | Stop reason: HOSPADM

## 2021-12-20 RX ORDER — PYRIDOXINE HCL (VITAMIN B6) 50 MG
100 TABLET ORAL DAILY
Status: DISCONTINUED | OUTPATIENT
Start: 2021-12-20 | End: 2021-12-20 | Stop reason: HOSPADM

## 2021-12-20 RX ORDER — ATORVASTATIN CALCIUM 10 MG/1
10 TABLET, FILM COATED ORAL DAILY
Status: DISCONTINUED | OUTPATIENT
Start: 2021-12-20 | End: 2021-12-20 | Stop reason: HOSPADM

## 2021-12-20 RX ORDER — FERROUS SULFATE 325(65) MG
325 TABLET ORAL
Status: DISCONTINUED | OUTPATIENT
Start: 2021-12-20 | End: 2021-12-20 | Stop reason: HOSPADM

## 2021-12-20 RX ADMIN — FERROUS SULFATE TAB 325 MG (65 MG ELEMENTAL FE) 325 MG: 325 (65 FE) TAB at 12:45

## 2021-12-20 RX ADMIN — LORAZEPAM 1 MG: 2 INJECTION INTRAMUSCULAR; INTRAVENOUS at 00:28

## 2021-12-20 RX ADMIN — WARFARIN SODIUM 5.5 MG: 5 TABLET ORAL at 00:30

## 2021-12-20 RX ADMIN — Medication 0.8 MG: at 12:45

## 2021-12-20 RX ADMIN — ATORVASTATIN CALCIUM 10 MG: 10 TABLET, FILM COATED ORAL at 12:46

## 2021-12-20 RX ADMIN — Medication 125 MCG: at 12:47

## 2021-12-20 RX ADMIN — GABAPENTIN 100 MG: 100 CAPSULE ORAL at 13:30

## 2021-12-20 RX ADMIN — Medication 100 MG: at 13:30
